# Patient Record
Sex: MALE | Race: WHITE | NOT HISPANIC OR LATINO | Employment: UNEMPLOYED | URBAN - METROPOLITAN AREA
[De-identification: names, ages, dates, MRNs, and addresses within clinical notes are randomized per-mention and may not be internally consistent; named-entity substitution may affect disease eponyms.]

---

## 2023-08-28 ENCOUNTER — HOSPITAL ENCOUNTER (EMERGENCY)
Facility: HOSPITAL | Age: 38
Discharge: HOME | End: 2023-08-28
Attending: STUDENT IN AN ORGANIZED HEALTH CARE EDUCATION/TRAINING PROGRAM

## 2023-08-28 VITALS
TEMPERATURE: 97 F | DIASTOLIC BLOOD PRESSURE: 76 MMHG | RESPIRATION RATE: 18 BRPM | SYSTOLIC BLOOD PRESSURE: 125 MMHG | HEART RATE: 83 BPM | WEIGHT: 210 LBS | HEIGHT: 73 IN | OXYGEN SATURATION: 97 % | BODY MASS INDEX: 27.83 KG/M2

## 2023-08-28 DIAGNOSIS — N20.0 KIDNEY STONE ON LEFT SIDE: Primary | ICD-10-CM

## 2023-08-28 LAB
ALBUMIN SERPL-MCNC: 3.8 G/DL (ref 3.5–5)
ALBUMIN/GLOB SERPL: 1.2 RATIO (ref 1.1–2)
ALP SERPL-CCNC: 90 UNIT/L (ref 40–150)
ALT SERPL-CCNC: 32 UNIT/L (ref 0–55)
APPEARANCE UR: CLEAR
AST SERPL-CCNC: 21 UNIT/L (ref 5–34)
BACTERIA #/AREA URNS AUTO: ABNORMAL /HPF
BASOPHILS # BLD AUTO: 0.03 X10(3)/MCL
BASOPHILS NFR BLD AUTO: 0.6 %
BILIRUB SERPL-MCNC: 0.4 MG/DL
BILIRUB UR QL STRIP.AUTO: NEGATIVE
BUN SERPL-MCNC: 11.1 MG/DL (ref 8.9–20.6)
CALCIUM SERPL-MCNC: 9.1 MG/DL (ref 8.4–10.2)
CHLORIDE SERPL-SCNC: 107 MMOL/L (ref 98–107)
CK SERPL-CCNC: 66 U/L (ref 30–200)
CO2 SERPL-SCNC: 24 MMOL/L (ref 22–29)
COLOR UR: YELLOW
CREAT SERPL-MCNC: 1.1 MG/DL (ref 0.73–1.18)
EOSINOPHIL # BLD AUTO: 0.08 X10(3)/MCL (ref 0–0.9)
EOSINOPHIL NFR BLD AUTO: 1.5 %
ERYTHROCYTE [DISTWIDTH] IN BLOOD BY AUTOMATED COUNT: 13.8 % (ref 11.5–17)
GFR SERPLBLD CREATININE-BSD FMLA CKD-EPI: >60 MLS/MIN/1.73/M2
GLOBULIN SER-MCNC: 3.2 GM/DL (ref 2.4–3.5)
GLUCOSE SERPL-MCNC: 127 MG/DL (ref 74–100)
GLUCOSE UR QL STRIP.AUTO: NEGATIVE
HCT VFR BLD AUTO: 40.3 % (ref 42–52)
HGB BLD-MCNC: 13.5 G/DL (ref 14–18)
IMM GRANULOCYTES # BLD AUTO: 0.02 X10(3)/MCL (ref 0–0.04)
IMM GRANULOCYTES NFR BLD AUTO: 0.4 %
KETONES UR QL STRIP.AUTO: NEGATIVE
LEUKOCYTE ESTERASE UR QL STRIP.AUTO: NEGATIVE
LYMPHOCYTES # BLD AUTO: 0.94 X10(3)/MCL (ref 0.6–4.6)
LYMPHOCYTES NFR BLD AUTO: 17.2 %
MCH RBC QN AUTO: 29.5 PG (ref 27–31)
MCHC RBC AUTO-ENTMCNC: 33.5 G/DL (ref 33–36)
MCV RBC AUTO: 88 FL (ref 80–94)
MONOCYTES # BLD AUTO: 0.51 X10(3)/MCL (ref 0.1–1.3)
MONOCYTES NFR BLD AUTO: 9.4 %
NEUTROPHILS # BLD AUTO: 3.87 X10(3)/MCL (ref 2.1–9.2)
NEUTROPHILS NFR BLD AUTO: 70.9 %
NITRITE UR QL STRIP.AUTO: NEGATIVE
NRBC BLD AUTO-RTO: 0 %
PH UR STRIP.AUTO: 6 [PH]
PLATELET # BLD AUTO: 236 X10(3)/MCL (ref 130–400)
PMV BLD AUTO: 10.9 FL (ref 7.4–10.4)
POTASSIUM SERPL-SCNC: 4 MMOL/L (ref 3.5–5.1)
PROT SERPL-MCNC: 7 GM/DL (ref 6.4–8.3)
PROT UR QL STRIP.AUTO: NEGATIVE
RBC # BLD AUTO: 4.58 X10(6)/MCL (ref 4.7–6.1)
RBC #/AREA URNS AUTO: ABNORMAL /HPF
RBC UR QL AUTO: ABNORMAL
SODIUM SERPL-SCNC: 138 MMOL/L (ref 136–145)
SP GR UR STRIP.AUTO: 1.02 (ref 1–1.03)
SQUAMOUS #/AREA URNS AUTO: ABNORMAL /HPF
UROBILINOGEN UR STRIP-ACNC: 0.2
WBC # SPEC AUTO: 5.45 X10(3)/MCL (ref 4.5–11.5)
WBC #/AREA URNS AUTO: ABNORMAL /HPF

## 2023-08-28 PROCEDURE — 96361 HYDRATE IV INFUSION ADD-ON: CPT

## 2023-08-28 PROCEDURE — 80053 COMPREHEN METABOLIC PANEL: CPT | Performed by: STUDENT IN AN ORGANIZED HEALTH CARE EDUCATION/TRAINING PROGRAM

## 2023-08-28 PROCEDURE — 25500020 PHARM REV CODE 255: Performed by: STUDENT IN AN ORGANIZED HEALTH CARE EDUCATION/TRAINING PROGRAM

## 2023-08-28 PROCEDURE — 81001 URINALYSIS AUTO W/SCOPE: CPT | Performed by: STUDENT IN AN ORGANIZED HEALTH CARE EDUCATION/TRAINING PROGRAM

## 2023-08-28 PROCEDURE — 85025 COMPLETE CBC W/AUTO DIFF WBC: CPT | Performed by: STUDENT IN AN ORGANIZED HEALTH CARE EDUCATION/TRAINING PROGRAM

## 2023-08-28 PROCEDURE — 96375 TX/PRO/DX INJ NEW DRUG ADDON: CPT

## 2023-08-28 PROCEDURE — 96374 THER/PROPH/DIAG INJ IV PUSH: CPT

## 2023-08-28 PROCEDURE — 63600175 PHARM REV CODE 636 W HCPCS: Performed by: STUDENT IN AN ORGANIZED HEALTH CARE EDUCATION/TRAINING PROGRAM

## 2023-08-28 PROCEDURE — 99285 EMERGENCY DEPT VISIT HI MDM: CPT | Mod: 25

## 2023-08-28 PROCEDURE — 82550 ASSAY OF CK (CPK): CPT | Performed by: STUDENT IN AN ORGANIZED HEALTH CARE EDUCATION/TRAINING PROGRAM

## 2023-08-28 RX ORDER — TAMSULOSIN HYDROCHLORIDE 0.4 MG/1
0.4 CAPSULE ORAL DAILY
Qty: 30 CAPSULE | Refills: 0 | Status: SHIPPED | OUTPATIENT
Start: 2023-08-28 | End: 2023-09-27

## 2023-08-28 RX ORDER — HYDROCODONE BITARTRATE AND ACETAMINOPHEN 5; 325 MG/1; MG/1
1 TABLET ORAL EVERY 8 HOURS PRN
Qty: 8 TABLET | Refills: 0 | Status: SHIPPED | OUTPATIENT
Start: 2023-08-28

## 2023-08-28 RX ORDER — KETOROLAC TROMETHAMINE 10 MG/1
10 TABLET, FILM COATED ORAL EVERY 6 HOURS
Qty: 16 TABLET | Refills: 0 | Status: SHIPPED | OUTPATIENT
Start: 2023-08-28 | End: 2023-09-01

## 2023-08-28 RX ORDER — ONDANSETRON 4 MG/1
4 TABLET, ORALLY DISINTEGRATING ORAL EVERY 8 HOURS PRN
Qty: 12 TABLET | Refills: 0 | Status: SHIPPED | OUTPATIENT
Start: 2023-08-28

## 2023-08-28 RX ORDER — ONDANSETRON 2 MG/ML
4 INJECTION INTRAMUSCULAR; INTRAVENOUS
Status: COMPLETED | OUTPATIENT
Start: 2023-08-28 | End: 2023-08-28

## 2023-08-28 RX ORDER — KETOROLAC TROMETHAMINE 30 MG/ML
15 INJECTION, SOLUTION INTRAMUSCULAR; INTRAVENOUS
Status: COMPLETED | OUTPATIENT
Start: 2023-08-28 | End: 2023-08-28

## 2023-08-28 RX ADMIN — IOPAMIDOL 100 ML: 755 INJECTION, SOLUTION INTRAVENOUS at 08:08

## 2023-08-28 RX ADMIN — ONDANSETRON 4 MG: 2 INJECTION INTRAMUSCULAR; INTRAVENOUS at 07:08

## 2023-08-28 RX ADMIN — SODIUM CHLORIDE, POTASSIUM CHLORIDE, SODIUM LACTATE AND CALCIUM CHLORIDE 1000 ML: 600; 310; 30; 20 INJECTION, SOLUTION INTRAVENOUS at 07:08

## 2023-08-28 RX ADMIN — KETOROLAC TROMETHAMINE 15 MG: 30 INJECTION, SOLUTION INTRAMUSCULAR; INTRAVENOUS at 07:08

## 2023-08-28 NOTE — ED PROVIDER NOTES
"Encounter Date: 8/28/2023       History     Chief Complaint   Patient presents with    Abdominal Pain     L sided abd pain x a few hours. Denies hx of kidney stones or dysuria. States urine is "a weird color".      38-year-old male with no past medical history presents emergency department chief complaint of left lower quadrant abdominal pain.  He states pain began suddenly several hours ago.  Denies any history of kidney stones.  No history of surgeries.  Did not take any medication prior to arrival.  He also reports feeling nauseous and his urine has been dark.  He is come in the emergency department further evaluation.        Review of patient's allergies indicates:   Allergen Reactions    Bactrim [sulfamethoxazole-trimethoprim]      No past medical history on file.  No past surgical history on file.  No family history on file.     Review of Systems   Constitutional:  Negative for chills and fever.   Respiratory:  Negative for chest tightness.    Cardiovascular:  Negative for chest pain.   Gastrointestinal:  Positive for abdominal pain and nausea. Negative for vomiting.   Genitourinary:  Negative for dysuria, hematuria and urgency.       Physical Exam     Initial Vitals [08/28/23 0612]   BP Pulse Resp Temp SpO2   (!) 175/103 67 18 97.3 °F (36.3 °C) 100 %      MAP       --         Physical Exam    Nursing note and vitals reviewed.  Constitutional: He appears well-developed and well-nourished. He is not diaphoretic. He appears distressed (patient appears uncomfortable).   HENT:   Head: Normocephalic and atraumatic.   Right Ear: External ear normal.   Left Ear: External ear normal.   Nose: Nose normal.   Eyes: EOM are normal. Pupils are equal, round, and reactive to light. Right eye exhibits no discharge. Left eye exhibits no discharge.   Cardiovascular:  Normal rate, regular rhythm and normal heart sounds.     Exam reveals no gallop and no friction rub.       No murmur heard.  Pulmonary/Chest: Effort normal and " breath sounds normal. No respiratory distress. He has no wheezes. He has no rhonchi. He has no rales. He exhibits no tenderness.   Abdominal: Abdomen is soft. Bowel sounds are normal. He exhibits no distension and no mass. There is abdominal tenderness (Left lower quadrant tenderness palpation).   Left CVA tenderness to palpation There is no rebound and no guarding.   Musculoskeletal:         General: No edema. Normal range of motion.     Neurological: He is alert and oriented to person, place, and time. No cranial nerve deficit or sensory deficit.   Skin: Skin is warm and dry. Capillary refill takes less than 2 seconds.         ED Course   Procedures  Labs Reviewed   COMPREHENSIVE METABOLIC PANEL - Abnormal; Notable for the following components:       Result Value    Glucose Level 127 (*)     All other components within normal limits   URINALYSIS, REFLEX TO URINE CULTURE - Abnormal; Notable for the following components:    Blood, UA 3+ (*)     All other components within normal limits   CBC WITH DIFFERENTIAL - Abnormal; Notable for the following components:    RBC 4.58 (*)     Hgb 13.5 (*)     Hct 40.3 (*)     MPV 10.9 (*)     All other components within normal limits   URINALYSIS, MICROSCOPIC - Abnormal; Notable for the following components:    RBC, UA 6-10 (*)     All other components within normal limits   CK - Normal   CBC W/ AUTO DIFFERENTIAL    Narrative:     The following orders were created for panel order CBC auto differential.  Procedure                               Abnormality         Status                     ---------                               -----------         ------                     CBC with Differential[[id number]]        Abnormal            Final result                 Please view results for these tests on the individual orders.          Imaging Results              CT Abdomen Pelvis With Contrast (Final result)  Result time 08/28/23 08:07:02      Final result by Kate Sandhu MD  (08/28/23 08:07:02)                   Impression:      4 mm distal left ureteral calculus with hydronephrosis and periureteral inflammatory changes.      Electronically signed by: Kate Sandhu  Date:    08/28/2023  Time:    08:07               Narrative:    EXAMINATION:  CT ABDOMEN PELVIS WITH CONTRAST    CLINICAL HISTORY:  Flank pain, kidney stone suspected;LLQ abdominal pain;    TECHNIQUE:  CT imaging was performed of the abdomen and pelvis after the administration of intravenous contrast. Dose length product is 834  mGycm. Automatic exposure control, adjustment of mA/kV or iterative reconstruction technique was used to limit radiation dose.    COMPARISON:  None    FINDINGS:  Liver: Normal.    Gallbladder and biliary tree: No calcified gallstones. No intra or extrahepatic biliary ductal dilation.    Pancreas: Normal.    Spleen: Punctate calcified granulomas are noted.    Adrenals: Normal.    Kidneys and ureters: There is a 4 mm calculus in the distal left ureter.  There is mild left hydronephrosis with limit Sotero changes around the ureter.  There is an additional punctate nonobstructing left renal calculus.    Bladder: Normal.    Reproductive organs: No pelvic masses.    Stomach/bowel: No evidence of bowel obstruction. Appendix is normal. No discernible bowel inflammation.    Lymph nodes: No pathologically enlarged lymph node identified.    Peritoneum: No ascites or free air. No fluid collection.    Vessels: No abdominal aortic aneurysm.    Abdominal wall: Normal.    Lung bases: No consolidation or pleural effusion.    Bones: No acute osseous findings.                                       Medications   ketorolac injection 15 mg (15 mg Intravenous Given 8/28/23 0730)   ondansetron injection 4 mg (4 mg Intravenous Given 8/28/23 0730)   lactated ringers bolus 1,000 mL (0 mLs Intravenous Stopped 8/28/23 0830)   iopamidoL (ISOVUE-370) injection 100 mL (100 mLs Intravenous Given 8/28/23 0801)     Medical Decision  Making  Patient presents with right lower quadrant abdominal pain.      Differential diagnosis to include but not limited to diverticulitis, renal calculi, abscess, muscle strain, UTI.      Patient is awake alert.  Appears uncomfortable.  Difficulty getting comfortable on stretcher.  His pain is almost completely resolved after Toradol.  On re-evaluation he was found to be sleeping quietly.  Awakens without difficulty.  Reports Toradol helped him immensely.  CT does reveal a kidney stone on the left near the UVJ.  His urinalysis not reveal any evidence of infection, only some scant RBCs.  Possibly patient was already passed the stone given his admits relief of pain.  No nausea no vomiting no fever no chills.  He is suitable for discharge home at this time.  He is a hot shot .  He is not from here, he was from [place].  We will discharge him with Toradol, Norco, Zofran, Flomax.  He is encouraged return emergency department with any worsening symptoms including fever chills nausea vomiting inability to eat, drink, take his medication.  He was educated that Norco can not be taken while driving.  Can be addictive, can cause drowsiness.  He is encouraged to take the Toradol but not take any other NSAIDs while taking.  Patient voiced understanding. Return precautions given.  Questions invited, questions answered to the best my ability.  Patient discharged home condition stable.      Amount and/or Complexity of Data Reviewed  External Data Reviewed: notes.     Details: See ED course  Labs: ordered. Decision-making details documented in ED Course.     Details: No UTI  Radiology: ordered and independent interpretation performed.     Details: Stone at UVJ    Risk  Prescription drug management.               ED Course as of 08/28/23 1004   Mon Aug 28, 2023   0626 There is no charts to review including care everywhere. [MM]   0702 CBC auto differential(!)  Mild anemia.  No leukocytosis. [MM]   0719 CPK: 66 [MM]   0758  Comprehensive metabolic panel(!)  No electrolyte abnormality or renal dysfunction. [MM]      ED Course User Index  [MM] Siddharth Levi MD                    Clinical Impression:   Final diagnoses:  [N20.0] Kidney stone on left side (Primary)        ED Disposition Condition    Discharge Stable          ED Prescriptions       Medication Sig Dispense Start Date End Date Auth. Provider    HYDROcodone-acetaminophen (NORCO) 5-325 mg per tablet Take 1 tablet by mouth every 8 (eight) hours as needed for Pain. 8 tablet 8/28/2023 -- Siddharth Levi MD    ketorolac (TORADOL) 10 mg tablet Take 1 tablet (10 mg total) by mouth every 6 (six) hours. for 4 days 16 tablet 8/28/2023 9/1/2023 Siddharth Levi MD    tamsulosin (FLOMAX) 0.4 mg Cap Take 1 capsule (0.4 mg total) by mouth once daily. 30 capsule 8/28/2023 9/27/2023 Siddharth Levi MD    ondansetron (ZOFRAN-ODT) 4 MG TbDL Take 1 tablet (4 mg total) by mouth every 8 (eight) hours as needed (nausea vomiting). 12 tablet 8/28/2023 -- Siddharth Levi MD          Follow-up Information       Follow up With Specialties Details Why Contact [facility], [facility]  Call   [address] [facility] [id number] [phone]               Siddharth Levi MD  08/28/23 0993

## 2023-08-28 NOTE — DISCHARGE INSTRUCTIONS
Thanks for letting us take care of you today!  It is our goal to give you courteous care and to keep you comfortable and informed, if you have any questions before you leave I will be happy to try and answer them.    Here is some advice after your visit:    Your visit in the emergency department is NOT definitive care - please follow-up with your primary care doctor and/or specialist within 1-2 days. Please return to the emergency department if you develop worsening symptoms including: fever, chills, chest pain, shortness of breath, weakness, numbness, tingling, nausea, vomiting, inability to eat, drink, or take your medication. Please return if you have any worsening in your condition or if you have any other concerns.    If you had radiology exams like an XRAY or CT in the emergency Department the interpreation on them may be preliminary - there may be less time sensitive findings on the reports please obtain these reports within 24 hours from the hospital or by using your out on your mobile phone to access records.  Bring these to your primary care doctor and/or specialist for further review of incidental findings.    Please review any LAB WORK from your visit today with your primary care physician.    You have been prescribed ketorolac/Toradol.  If you are taking his medication please do not take any additional NSAIDs including ibuprofen, naproxen, indomethacin.  Please stay hydrated when taking this medication.    You have been prescribed hydrocodone/acetaminophen (Norco).  This is a narcotic/opioid medication.  Please be aware this may make you drowsy, do not operate heavy machinery or drive when taking this medication.  Also be aware this medication contains acetaminophen/Tylenol do not take any additional acetaminophen/Tylenol when taking this medication.    Please present to the nearest emergency department if you develop fever chills nausea vomiting weakness numbness tingling inability to eat, drink, take  your medication.    You may need to follow up with the urologist if pain continues.